# Patient Record
Sex: FEMALE | Race: BLACK OR AFRICAN AMERICAN | Employment: UNEMPLOYED | URBAN - METROPOLITAN AREA
[De-identification: names, ages, dates, MRNs, and addresses within clinical notes are randomized per-mention and may not be internally consistent; named-entity substitution may affect disease eponyms.]

---

## 2017-04-13 DIAGNOSIS — Z78.0 POST-MENOPAUSAL: ICD-10-CM

## 2017-04-13 DIAGNOSIS — C50.912 MALIGNANT NEOPLASM OF LEFT FEMALE BREAST, UNSPECIFIED SITE OF BREAST: ICD-10-CM

## 2017-04-13 RX ORDER — ANASTROZOLE 1 MG/1
1 TABLET ORAL DAILY
Qty: 30 TAB | Refills: 6 | Status: SHIPPED | OUTPATIENT
Start: 2017-04-13 | End: 2017-10-09 | Stop reason: SDUPTHER

## 2017-04-14 ENCOUNTER — HOSPITAL ENCOUNTER (OUTPATIENT)
Dept: MAMMOGRAPHY | Age: 62
Discharge: HOME | End: 2017-04-14
Attending: RADIOLOGY
Payer: MEDICAID

## 2017-04-14 DIAGNOSIS — C50.919 BREAST CANCER (HCC): ICD-10-CM

## 2017-04-14 PROCEDURE — 77066 DX MAMMO INCL CAD BI: CPT

## 2017-04-18 ENCOUNTER — HOSPITAL ENCOUNTER (OUTPATIENT)
Dept: MAMMOGRAPHY | Age: 62
Discharge: HOME | End: 2017-04-18
Attending: RADIOLOGY
Payer: MEDICAID

## 2017-04-18 VITALS
DIASTOLIC BLOOD PRESSURE: 73 MMHG | SYSTOLIC BLOOD PRESSURE: 122 MMHG | RESPIRATION RATE: 20 BRPM | HEART RATE: 75 BPM | WEIGHT: 198 LBS | TEMPERATURE: 98.6 F | OXYGEN SATURATION: 96 % | BODY MASS INDEX: 33.8 KG/M2 | HEIGHT: 64 IN

## 2017-04-18 DIAGNOSIS — R92.0 MAMMOGRAPHIC MICROCALCIFICATION: ICD-10-CM

## 2017-04-18 DIAGNOSIS — R92.8 ABNORMAL MAMMOGRAM: ICD-10-CM

## 2017-04-18 PROCEDURE — A4648 IMPLANTABLE TISSUE MARKER: HCPCS

## 2017-04-18 PROCEDURE — 77065 DX MAMMO INCL CAD UNI: CPT

## 2017-04-18 PROCEDURE — 19082 BX BREAST ADD LESION STRTCTC: CPT

## 2017-04-18 PROCEDURE — 77030013689 HC GD NDL EVIVA HOLO -A

## 2017-04-18 PROCEDURE — 77030030538 HC HNDPC BIOP EVIVA HOLO -C

## 2017-04-18 PROCEDURE — 74011000250 HC RX REV CODE- 250: Performed by: RADIOLOGY

## 2017-04-18 PROCEDURE — 88305 TISSUE EXAM BY PATHOLOGIST: CPT | Performed by: RADIOLOGY

## 2017-04-18 PROCEDURE — 19081 BX BREAST 1ST LESION STRTCTC: CPT

## 2017-04-18 RX ORDER — LIDOCAINE HYDROCHLORIDE AND EPINEPHRINE 10; 10 MG/ML; UG/ML
16 INJECTION, SOLUTION INFILTRATION; PERINEURAL ONCE
Status: ACTIVE | OUTPATIENT
Start: 2017-04-18 | End: 2017-04-18

## 2017-04-18 RX ORDER — LISINOPRIL AND HYDROCHLOROTHIAZIDE 20; 25 MG/1; MG/1
1 TABLET ORAL DAILY
COMMUNITY

## 2017-04-18 RX ORDER — LIDOCAINE HYDROCHLORIDE 10 MG/ML
24 INJECTION, SOLUTION EPIDURAL; INFILTRATION; INTRACAUDAL; PERINEURAL
Status: COMPLETED | OUTPATIENT
Start: 2017-04-18 | End: 2017-04-18

## 2017-04-18 RX ADMIN — LIDOCAINE HYDROCHLORIDE 24 ML: 10 INJECTION, SOLUTION EPIDURAL; INFILTRATION; INTRACAUDAL; PERINEURAL at 12:10

## 2017-04-18 NOTE — DISCHARGE INSTRUCTIONS
MD Kole Rodriguez [facility], RT    Alyssa Lopez, OVI                                        Instructions Following Your Breast Biopsy         Pain Control    · Tylenol should be taken as directed on the back of the bottle (gerson 4-6 hours) for the next 24 hours post breast biopsy unless directed otherwise by a physician. · No Aspirin or Anti-Inflammatory  (Motrin, Advil ) medications for 24 hours. · Wearing a soft, comfortable (Wire free ) bra, even at night, for 24 hours is helpful. · Use a clean, covered ice pack over the site every 1- 2 hours til bedtime, for 20-30 minutes at a time for the first 24 hours. Biopsy Site     · A small amount of bleeding and /or oozing from the Biopsy site is normal, as well as bloody nipple discharge, which is rare. · You may notice bruising on the skin over the next few days. · Steri Strips and a dressing have been applied. · The steri strips can remain on for up to 5 days. · The clean dressing can be removed in 48 hours, however, if the dressing becomes loose, causes redness or irritation of the skin or any drainage has collected, please remove it an replace it with a Band-Aid. · Avoid getting the Biopsy site wet for 48 hours, avoid showering, swimming and hot tubs. · Should you have excessive bleeding or pain, please seek medical treatment or call                 [email or web address] [phone], 7:30 - 4:00 p.m. After hours (ask to speak to the on-call Radiology Nurse-RN)                                      [phone] or [phone]      Activity      · Rest the day of the biopsy, avoiding strenuous activities and any heavy lifting. · You may resume most activities/ work the following day. Pathology Report     · The results of your Pathology report, from the laboratory, should be available in 3-5 business days.   The Radiologist will review your results and, based on your preference, we will be happy to provide results to you by phone or in person. · You will also be advised, at that time, of any further appointments or follow- up you may need.       Other

## 2017-04-18 NOTE — ROUTINE PROCESS
I have reviewed discharge instructions with the patient. The patient verbalized understanding. Copy of discharge instructions per AVS copied, reviewed with pt., signature sheet signed, and copy given to pt. Prior to discharge along with copy of sig. Sheet to med. Rec. For scanning r/t penpad not working. Pt stable without c/o pain at biopsy sites. Left breast biopsy sites (3 o'clock and 6 o'clock) noted clean/dry/intact with tegaderm and ice pack applied to site prior to discharge. Pt dressed self and after ok per Dr. Rosa Martínez for discharge, pt. Ambulated to waiting room for discharge to home via private vehicle with pt. Driving herself home. Pt accomp. By  Nurse to waiting room.

## 2017-04-18 NOTE — PROGRESS NOTES
Lilly Campos, repositioning pt. For procedure numerous times with Dr. Beverley Hemphill in to check stereo films.

## 2017-04-18 NOTE — IP AVS SNAPSHOT
[address] [facility] 
[phone] Patient: Sina Rey MRN: [id number] FARSHAD:[birth year] You are allergic to the following No active allergies Recent Documentation Height Weight Breastfeeding? BMI OB Status Smoking Status 1.613 m 89.8 kg No 34.52 kg/m2 Postmenopausal Never Smoker Emergency Contacts Name Discharge Info Relation Home Work Mobile Sukhwinder Quintero CAREGIVER [3] Sister [23] [phone]    
 ALBANIASidra Robles  Other Relative [6] [phone] About your hospitalization You were admitted on:  April 18, 2017 You last received care in the:  [address] [place] [address] You were discharged on:  April 18, 2017 Unit phone number:  [phone] Why you were hospitalized Your primary diagnosis was:  Not on File Providers Seen During Your Hospitalizations Provider Role Specialty Primary office phone George Pina MD Attending Provider Radiation Oncology [phone] Your Primary Care Physician (PCP) Primary Care Physician Office Phone Office Fax Holger Hakeem [phone] Follow-up Information Follow up With Details Comments Contact Info Conrad Yip NP  As needed [address] [facility] 
[phone] George Pina MD   [address] 
 
[facility] [address] [place] [address] 
[phone] Your Appointments Tuesday April 18, 2017 11:05 AM EDT  
[facility] ST VAC BX ADL CL/SPC with [facility] STEREO 1 [facility] [address] [facility]  
[phone] Arrive 30 minutes prior to exam time (15 minutes if scheduled at [facility]). Wear 2 piece outfit. Do not wear deodorant. Allow 2 Hrs for procedure. If on blood thinners, check with physician BEFORE discontinuing.  Bring Tylenol with you. Please bring someone with you if possible. ANY QUESTIONS, call Dept. -4975 [facility] [phone] [facility] [phone] [facility] [phone] [facility] Patient should report to outpatient registration (Medical Office Building One) 30 minutes prior to the appointment time unless instructed otherwise. Current Discharge Medication List  
  
ASK your doctor about these medications Dose & Instructions Dispensing Information Comments Morning Noon Evening Bedtime ADVIL 200 mg tablet Generic drug:  ibuprofen Your last dose was: Your next dose is:    
   
   
 Dose:  400 mg Take 400 mg by mouth every six (6) hours as needed for Pain. Refills:  0  
     
   
   
   
  
 anastrozole 1 mg tablet Commonly known as:  ARIMIDEX Your last dose was: Your next dose is:    
   
   
 Dose:  1 mg Take 1 Tab by mouth daily. Quantity:  30 Tab Refills:  6  
     
   
   
   
  
 levothyroxine 25 mcg tablet Commonly known as:  SYNTHROID Your last dose was: Your next dose is: Take  by mouth Daily (before breakfast). Refills:  0  
     
   
   
   
  
 lisinopril-hydroCHLOROthiazide 20-25 mg per tablet Commonly known as:  Pepito Stank Your last dose was: Your next dose is:    
   
   
 Dose:  1 Tab Take 1 Tab by mouth daily. Refills:  0 Discharge Instructions Dr. Bebe Soliman MD 
 
Iris Andres tech, RT Jaja Faith, RN Instructions Following Your Breast Biopsy Pain Control · Tylenol should be taken as directed on the back of the bottle (gerson 4-6 hours) for the next 24 hours post breast biopsy unless directed otherwise by a physician. · No Aspirin or Anti-Inflammatory  (Motrin, Advil ) medications for 24 hours. · Wearing a soft, comfortable (Wire free ) bra, even at night, for 24 hours is helpful. · Use a clean, covered ice pack over the site every 1- 2 hours til bedtime, for 20-30 minutes at a time for the first 24 hours. Biopsy Site · A small amount of bleeding and /or oozing from the Biopsy site is normal, as well as bloody nipple discharge, which is rare. · You may notice bruising on the skin over the next few days. · Steri Strips and a dressing have been applied. · The steri strips can remain on for up to 5 days. · The clean dressing can be removed in 48 hours, however, if the dressing becomes loose, causes redness or irritation of the skin or any drainage has collected, please remove it an replace it with a Band-Aid. · Avoid getting the Biopsy site wet for 48 hours, avoid showering, swimming and hot tubs. · Should you have excessive bleeding or pain, please seek medical treatment or call [address] [employer] [phone], 7:30 - 4:00 p.m. After hours (ask to speak to the on-call Radiology Nurse-RN) [phone] or [phone] Activity · Rest the day of the biopsy, avoiding strenuous activities and any heavy lifting. · You may resume most activities/ work the following day. Pathology Report · The results of your Pathology report, from the laboratory, should be available in 3-5 business days. The Radiologist will review your results and, based on your preference, we will be happy to provide results to you by phone or in person. · You will also be advised, at that time, of any further appointments or follow- up you may need. Other Discharge Orders None Introducing [facility]! Zackary Granados introduces [employer] patient portal. Now you can access parts of your medical record, email your doctor's office, and request medication refills online.    
 
1. In your internet browser, go to [email or web address] 2. Click on the First Time User? Click Here link in the Sign In box. You will see the New Member Sign Up page. 3. Enter your [employer] Access Code exactly as it appears below. You will not need to use this code after youve completed the sign-up process. If you do not sign up before the expiration date, you must request a new code. · [employer] Access Code: [email or web address] Expires: 7/11/2017  9:36 AM 
 
4. Enter the last four digits of your Social Security Number (xxxx) and Date of Birth (mm/dd/yyyy) as indicated and click Submit. You will be taken to the next sign-up page. 5. Create a [employer] ID. This will be your [employer] login ID and cannot be changed, so think of one that is secure and easy to remember. 6. Create a [employer] password. You can change your password at any time. 7. Enter your Password Reset Question and Answer. This can be used at a later time if you forget your password. 8. Enter your e-mail address. You will receive e-mail notification when new information is available in [address] 9. Click Sign Up. You can now view and download portions of your medical record. 10. Click the Download Summary menu link to download a portable copy of your medical information. If you have questions, please visit the Frequently Asked Questions section of the [employer] website. Remember, [employer] is NOT to be used for urgent needs. For medical emergencies, dial 911. Now available from your iPhone and Android! General Information Please provide this summary of care documentation to your next provider. Patient Signature:  ____________________________________________________________ Date:  ____________________________________________________________  
  
Zackary Search Provider Signature:  ____________________________________________________________ Date:  ____________________________________________________________

## 2017-04-20 NOTE — PROGRESS NOTES
Dr. Babcock Figures reviewed pathology report and recommended to continue yearly screening mammogram follow up. The patient was notified of benign results and recommendation by the nurse, B. Throckmorton RN. Dr. Darinel Robison was faxed a copy of the pathology report with follow up recomendations. Letter to pt.

## 2017-09-10 RX ORDER — ZOLEDRONIC ACID 5 MG/100ML
5 INJECTION, SOLUTION INTRAVENOUS ONCE
Status: COMPLETED | OUTPATIENT
Start: 2017-09-12 | End: 2017-09-12

## 2017-09-12 ENCOUNTER — HOSPITAL ENCOUNTER (OUTPATIENT)
Dept: INFUSION THERAPY | Age: 62
Discharge: HOME | End: 2017-09-12
Payer: MEDICAID

## 2017-09-12 VITALS
RESPIRATION RATE: 18 BRPM | WEIGHT: 202.6 LBS | HEART RATE: 80 BPM | DIASTOLIC BLOOD PRESSURE: 64 MMHG | OXYGEN SATURATION: 99 % | BODY MASS INDEX: 35.33 KG/M2 | TEMPERATURE: 98.8 F | SYSTOLIC BLOOD PRESSURE: 103 MMHG

## 2017-09-12 LAB
ALBUMIN SERPL-MCNC: 3.5 G/DL (ref 3.5–5)
ANION GAP SERPL CALC-SCNC: 8 MMOL/L (ref 5–15)
BUN SERPL-MCNC: 14 MG/DL (ref 6–20)
BUN/CREAT SERPL: 19 (ref 12–20)
CALCIUM SERPL-MCNC: 9.1 MG/DL (ref 8.5–10.1)
CHLORIDE SERPL-SCNC: 104 MMOL/L (ref 97–108)
CO2 SERPL-SCNC: 27 MMOL/L (ref 21–32)
CREAT SERPL-MCNC: 0.73 MG/DL (ref 0.55–1.02)
GLUCOSE SERPL-MCNC: 120 MG/DL (ref 65–100)
PHOSPHATE SERPL-MCNC: 2.9 MG/DL (ref 2.6–4.7)
POTASSIUM SERPL-SCNC: 3.7 MMOL/L (ref 3.5–5.1)
SODIUM SERPL-SCNC: 139 MMOL/L (ref 136–145)

## 2017-09-12 PROCEDURE — 36415 COLL VENOUS BLD VENIPUNCTURE: CPT | Performed by: INTERNAL MEDICINE

## 2017-09-12 PROCEDURE — 74011250636 HC RX REV CODE- 250/636: Performed by: INTERNAL MEDICINE

## 2017-09-12 PROCEDURE — 96374 THER/PROPH/DIAG INJ IV PUSH: CPT

## 2017-09-12 PROCEDURE — 80069 RENAL FUNCTION PANEL: CPT | Performed by: INTERNAL MEDICINE

## 2017-09-12 RX ORDER — SODIUM CHLORIDE 9 MG/ML
25 INJECTION, SOLUTION INTRAVENOUS AS NEEDED
Status: DISPENSED | OUTPATIENT
Start: 2017-09-12 | End: 2017-09-13

## 2017-09-12 RX ORDER — SODIUM CHLORIDE 0.9 % (FLUSH) 0.9 %
10-40 SYRINGE (ML) INJECTION AS NEEDED
Status: ACTIVE | OUTPATIENT
Start: 2017-09-12 | End: 2017-09-13

## 2017-09-12 RX ADMIN — Medication 10 ML: at 11:40

## 2017-09-12 RX ADMIN — Medication 10 ML: at 12:25

## 2017-09-12 RX ADMIN — SODIUM CHLORIDE 25 ML/HR: 900 INJECTION, SOLUTION INTRAVENOUS at 11:41

## 2017-09-12 RX ADMIN — ZOLEDRONIC ACID 5 MG: 5 INJECTION, SOLUTION INTRAVENOUS at 12:05

## 2017-09-12 NOTE — PROGRESS NOTES
Outpatient Infusion Center Progress Note    4030- Pt admit to [facility] for Reclast ambulatory in stable condition. Assessment completed. No new concerns voiced. Pt denies any recent or upcoming dental work. PIV established R arm with positive blood return noted. Labs drawn and sent for processing. Patient Vitals for the past 4 hrs:   Temp Pulse Resp BP SpO2   09/12/17 1225 - 80 - 103/64 -   09/12/17 1115 98.8 °F (37.1 °C) 79 18 112/69 99 %       Recent Results (from the past 12 hour(s))   RENAL FUNCTION PANEL    Collection Time: 09/12/17 11:24 AM   Result Value Ref Range    Sodium 139 136 - 145 mmol/L    Potassium 3.7 3.5 - 5.1 mmol/L    Chloride 104 97 - 108 mmol/L    CO2 27 21 - 32 mmol/L    Anion gap 8 5 - 15 mmol/L    Glucose 120 (H) 65 - 100 mg/dL    BUN 14 6 - 20 MG/DL    Creatinine 0.73 0.55 - 1.02 MG/DL    BUN/Creatinine ratio 19 12 - 20      GFR est AA >60 >60 ml/min/1.73m2    GFR est non-AA >60 >60 ml/min/1.73m2    Calcium 9.1 8.5 - 10.1 MG/DL    Phosphorus 2.9 2.6 - 4.7 MG/DL    Albumin 3.5 3.5 - 5.0 g/dL       Medications:  NS KVO  Reclast 5 mg IV over 15 mins    1225- Pt tolerated treatment well. PIV flushed and discontinued, site wrapped in gauze and coban. D/c home ambulatory in no distress.

## 2017-10-09 ENCOUNTER — OFFICE VISIT (OUTPATIENT)
Dept: ONCOLOGY | Age: 62
End: 2017-10-09

## 2017-10-09 VITALS
SYSTOLIC BLOOD PRESSURE: 124 MMHG | BODY MASS INDEX: 36.32 KG/M2 | DIASTOLIC BLOOD PRESSURE: 77 MMHG | TEMPERATURE: 98.8 F | OXYGEN SATURATION: 98 % | WEIGHT: 205 LBS | HEART RATE: 77 BPM | HEIGHT: 63 IN

## 2017-10-09 DIAGNOSIS — D05.10 DUCTAL CARCINOMA IN SITU (DCIS) OF BREAST, UNSPECIFIED LATERALITY: Primary | ICD-10-CM

## 2017-10-09 DIAGNOSIS — Z78.0 POST-MENOPAUSAL: ICD-10-CM

## 2017-10-09 RX ORDER — ANASTROZOLE 1 MG/1
1 TABLET ORAL DAILY
Qty: 30 TAB | Refills: 6 | Status: SHIPPED | OUTPATIENT
Start: 2017-10-09 | End: 2018-05-16 | Stop reason: SDUPTHER

## 2017-10-09 NOTE — PROGRESS NOTES
Pt is a 63 yr old Pt here for a yearly follow up for left breast cancer completed treatment  In 2013. Pt is on Arimidex with some aches in knees and hip or ankle. Pt gets yearly reclast, last in September.

## 2017-10-09 NOTE — MR AVS SNAPSHOT
Visit Information Date & Time Provider Department Dept. Phone Encounter #  
 10/9/2017  1:00 PM Bret June MD [address] [facility] at [facility] [address] [phone] [id number] Your Appointments 10/10/2018 11:00 AM  
ESTABLISHED PATIENT with Bret June MD  
[address] [facility] at [facility]) Appt Note: 1 yr f/u  
 [address] [facility] [address] [id number]  
[address] [place] [address] [address] Vitor Verdugo Upcoming Health Maintenance Date Due Hepatitis C Screening 1955 Pneumococcal 19-64 Highest Risk (1 of 3 - PCV13) 5/13/1976 DTaP/Tdap/Td series (1 - Tdap) 5/13/1978 FOBT Q 1 YEAR AGE 50-75 5/13/2007 PAP AKA CERVICAL CYTOLOGY 4/8/2016 ZOSTER VACCINE AGE 60> 3/13/2017 INFLUENZA AGE 9 TO ADULT 8/1/2017 BREAST CANCER SCRN MAMMOGRAM 4/14/2019 Allergies as of 10/9/2017  Review Complete On: 9/12/2017 By: Celestino Walters RN No Known Allergies Current Immunizations  Reviewed on 9/12/2017 No immunizations on file. Not reviewed this visit You Were Diagnosed With   
  
 Codes Comments Ductal carcinoma in situ (DCIS) of breast, unspecified laterality    -  Primary ICD-10-CM: D05.10 ICD-9-CM: 233.0 Post-menopausal     ICD-10-CM: Z78.0 ICD-9-CM: V49.81 Vitals BP Pulse Temp Height(growth percentile) Weight(growth percentile) SpO2  
 124/77 77 98.8 °F (37.1 °C) 5' 3\" (1.6 m) 205 lb (93 kg) 98% BMI OB Status Smoking Status 36.31 kg/m2 Postmenopausal Never Smoker BMI and BSA Data Body Mass Index Body Surface Area  
 36.31 kg/m 2 2.03 m 2 Preferred Pharmacy Pharmacy Name Phone [id number] [phone] Your Updated Medication List  
  
   
This list is accurate as of: 10/9/17  1:24 PM.  Always use your most recent med list. ADVIL 200 mg tablet Generic drug:  ibuprofen Take 400 mg by mouth every six (6) hours as needed for Pain. anastrozole 1 mg tablet Commonly known as:  ARIMIDEX Take 1 Tab by mouth daily. levothyroxine 25 mcg tablet Commonly known as:  SYNTHROID Take  by mouth Daily (before breakfast). lisinopril-hydroCHLOROthiazide 20-25 mg per tablet Commonly known as:  Helyn Blinks Take 1 Tab by mouth daily. Prescriptions Sent to Pharmacy Refills  
 anastrozole (ARIMIDEX) 1 mg tablet 6 Sig: Take 1 Tab by mouth daily. Class: Normal  
 Pharmacy: [id number] - [place], [address]  #: [phone] Route: Oral  
  
Introducing [facility]! Roverto Wilkinson introduces [employer] patient portal. Now you can access parts of your medical record, email your doctor's office, and request medication refills online. 1. In your internet browser, go to [email or web address]. [employer] 2. Click on the First Time User? Click Here link in the Sign In box. You will see the New Member Sign Up page. 3. Enter your [employer] Access Code exactly as it appears below. You will not need to use this code after youve completed the sign-up process. If you do not sign up before the expiration date, you must request a new code. · [employer] Access Code: [email or web address] Expires: 12/6/2017  8:45 AM 
 
4. Enter the last four digits of your Social Security Number (xxxx) and Date of Birth (mm/dd/yyyy) as indicated and click Submit. You will be taken to the next sign-up page. 5. Create a Clips[employer] ID. This will be your [employer] login ID and cannot be changed, so think of one that is secure and easy to remember. 6. Create a [employer] password. You can change your password at any time. 7. Enter your Password Reset Question and Answer. This can be used at a later time if you forget your password. 8. Enter your e-mail address. You will receive e-mail notification when new information is available in [address] 9. Click Sign Up. You can now view and download portions of your medical record. 10. Click the Download Summary menu link to download a portable copy of your medical information. If you have questions, please visit the Frequently Asked Questions section of the [facility] website. Remember, [facility] is NOT to be used for urgent needs. For medical emergencies, dial 911. Now available from your iPhone and Android! Please provide this summary of care documentation to your next provider. Your primary care clinician is listed as Eri Hirsch. If you have any questions after today's visit, please call [phone].

## 2017-10-09 NOTE — PROGRESS NOTES
Follow-up Note        Patient: Shin Henao MRN: [id number]  SSN: [id number]    YOB: 1955  Age: 63 y.o. Sex: female          Diagnosis:      1. 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 S/P left breast lumpectomy 05/23/2013  2. Finished breast radiation 10/2013  3. On Arimidex      Subjective: Shin Henao is a 66 y.o.  female with a diagnosis of DCIS who underwent underwent a left breast lumpectomy on 05/23/2013. She finished radiation to the left breast and is on Arimidex. She has some joint pain but is otherwise doing well.      Review of Systems:     Constitutional: negative   Eyes: negative   Ears, Nose, Mouth, Throat, and Face: negative   Respiratory: negative   Cardiovascular: negative   Gastrointestinal: negative   Integument/Breast: negative   Hematologic/Lymphatic: negative   Musculoskeletal: negative  Neurological: negative             Past Medical History:   Diagnosis Date    Breast cancer ([address]     Cancer ([address]     left breast    GERD (gastroesophageal reflux disease)     resolved per pt    Hypertension     Radiation therapy complication     3625    Thyroid disease      Past Surgical History:   Procedure Laterality Date    BREAST SURGERY PROCEDURE UNLISTED      right breast 33 years ago    BREAST SURGERY PROCEDURE UNLISTED  5/23/13    LEFT BREAST EXCISIONAL BIOPSY WITH NEEDLE LOCALIZATION     HX BREAST BIOPSY  5/23/2013    BREAST BIOPSY performed by Wiliam Lux MD at [facility] MAIN OR    HX BREAST LUMPECTOMY  05/10/2013    HX GYN      x 2 delivery       Family History   Problem Relation Age of Onset    Breast Cancer Mother 58    Cancer Mother      breast    Heart Disease Mother     Cancer Father      throat    Hypertension Father     Breast Cancer Maternal Aunt 48    Cancer Maternal Aunt      breast    Breast Cancer Maternal Aunt 48    Cancer Maternal Aunt      breast    Breast Cancer Maternal Aunt 48    Cancer Maternal Aunt      breast    Cancer Maternal Aunt 61    Cancer Sister      breast    Cancer Paternal Aunt      breast    Cancer Paternal Aunt      breast     Social History   Substance Use Topics    Smoking status: Never Smoker    Smokeless tobacco: Never Used    Alcohol use No      Prior to Admission medications    Medication Sig Start Date End Date Taking? Authorizing Provider   anastrozole (ARIMIDEX) 1 mg tablet Take 1 Tab by mouth daily. 10/9/17  Yes Renny Jack NP   lisinopril-hydroCHLOROthiazide (PRINZIDE, ZESTORETIC) 20-25 mg per tablet Take 1 Tab by mouth daily. Yes Historical Provider   ibuprofen (ADVIL) 200 mg tablet Take 400 mg by mouth every six (6) hours as needed for Pain. Yes Historical Provider   levothyroxine (SYNTHROID) 25 mcg tablet Take  by mouth Daily (before breakfast). Yes Historical Provider        No Known Allergies      Objective:     Vitals:    10/09/17 1310   BP: 124/77   Pulse: 77   Temp: 98.8 °F (37.1 °C)   SpO2: 98%   Weight: 205 lb (93 kg)   Height: 5' 3\" (1.6 m)            Physical Exam:    GENERAL: alert, cooperative   EYE: conjunctivae/corneas clear. LYMPHATIC: Cervical, supraclavicular, and axillary nodes normal.   THROAT & NECK: normal and no erythema or exudates noted. LUNG: clear to auscultation bilaterally   HEART: regular rate and rhythm   ABDOMEN: soft, non-tender. EXTREMITIES: extremities normal   SKIN: Normal.   NEUROLOGIC: negative         Assessment:      1. Left breast DCIS:    Grade 1, ER +ve, [place] +     > Left breast lumpectomy 05/2013  > Finished breast radiation 10/2013  > On Arimidex 1 mg/day   > Tolerating well    Mammogram in April 2017 - benign finding on Bx        2. Osteopenia, AI related    > On Denosumab      Plan:       1. Continue Arimidex. 2. Follow up in 1 year. Signed by: Roscoe Carty MD                     October 9, 2017          CC.  Beverley Geiger MD

## 2018-04-16 ENCOUNTER — HOSPITAL ENCOUNTER (OUTPATIENT)
Dept: MAMMOGRAPHY | Age: 63
Discharge: HOME | End: 2018-04-16
Attending: RADIOLOGY
Payer: MEDICAID

## 2018-04-16 DIAGNOSIS — Z12.39 SCREENING BREAST EXAMINATION: ICD-10-CM

## 2018-04-16 DIAGNOSIS — Z85.3 HISTORY OF LEFT BREAST CANCER: ICD-10-CM

## 2018-04-16 PROCEDURE — 77066 DX MAMMO INCL CAD BI: CPT

## 2018-05-16 DIAGNOSIS — D05.10 DUCTAL CARCINOMA IN SITU (DCIS) OF BREAST, UNSPECIFIED LATERALITY: ICD-10-CM

## 2018-05-16 DIAGNOSIS — Z78.0 POST-MENOPAUSAL: ICD-10-CM

## 2018-05-16 RX ORDER — ANASTROZOLE 1 MG/1
1 TABLET ORAL DAILY
Qty: 90 TAB | Refills: 3 | Status: SHIPPED | OUTPATIENT
Start: 2018-05-16 | End: 2019-07-04 | Stop reason: SDUPTHER

## 2018-10-03 ENCOUNTER — HOSPITAL ENCOUNTER (OUTPATIENT)
Dept: ULTRASOUND IMAGING | Age: 63
Discharge: HOME | End: 2018-10-03
Attending: NURSE PRACTITIONER
Payer: MEDICAID

## 2018-10-03 DIAGNOSIS — R74.8 ELEVATED CREATINE KINASE: ICD-10-CM

## 2018-10-03 PROCEDURE — 76705 ECHO EXAM OF ABDOMEN: CPT

## 2018-10-10 ENCOUNTER — OFFICE VISIT (OUTPATIENT)
Dept: ONCOLOGY | Age: 63
End: 2018-10-10

## 2018-10-10 VITALS
HEART RATE: 61 BPM | OXYGEN SATURATION: 95 % | BODY MASS INDEX: 35.93 KG/M2 | HEIGHT: 63 IN | WEIGHT: 202.8 LBS | SYSTOLIC BLOOD PRESSURE: 104 MMHG | DIASTOLIC BLOOD PRESSURE: 71 MMHG | TEMPERATURE: 98.3 F | RESPIRATION RATE: 16 BRPM

## 2018-10-10 DIAGNOSIS — D05.12 DUCTAL CARCINOMA IN SITU (DCIS) OF LEFT BREAST: Primary | ICD-10-CM

## 2018-10-10 PROBLEM — M85.89 OSTEOPENIA OF MULTIPLE SITES: Status: ACTIVE | Noted: 2018-10-10

## 2018-10-10 RX ORDER — SODIUM CHLORIDE 9 MG/ML
10 INJECTION INTRAMUSCULAR; INTRAVENOUS; SUBCUTANEOUS AS NEEDED
Status: CANCELLED | OUTPATIENT
Start: 2018-10-16

## 2018-10-10 RX ORDER — HEPARIN 100 UNIT/ML
300-500 SYRINGE INTRAVENOUS AS NEEDED
Status: CANCELLED
Start: 2018-10-16

## 2018-10-10 RX ORDER — ALBUTEROL SULFATE 0.83 MG/ML
2.5 SOLUTION RESPIRATORY (INHALATION) AS NEEDED
Status: CANCELLED
Start: 2018-10-16

## 2018-10-10 RX ORDER — SODIUM CHLORIDE 0.9 % (FLUSH) 0.9 %
10 SYRINGE (ML) INJECTION AS NEEDED
Status: CANCELLED
Start: 2018-10-16

## 2018-10-10 RX ORDER — ACETAMINOPHEN 325 MG/1
650 TABLET ORAL AS NEEDED
Status: CANCELLED
Start: 2018-10-16

## 2018-10-10 RX ORDER — EPINEPHRINE 1 MG/ML
0.3 INJECTION, SOLUTION, CONCENTRATE INTRAVENOUS AS NEEDED
Status: CANCELLED | OUTPATIENT
Start: 2018-10-16

## 2018-10-10 RX ORDER — DIPHENHYDRAMINE HYDROCHLORIDE 50 MG/ML
50 INJECTION, SOLUTION INTRAMUSCULAR; INTRAVENOUS AS NEEDED
Status: CANCELLED
Start: 2018-10-16

## 2018-10-10 RX ORDER — SODIUM CHLORIDE 9 MG/ML
25 INJECTION, SOLUTION INTRAVENOUS CONTINUOUS
Status: CANCELLED | OUTPATIENT
Start: 2018-10-16

## 2018-10-10 RX ORDER — ONDANSETRON 2 MG/ML
8 INJECTION INTRAMUSCULAR; INTRAVENOUS AS NEEDED
Status: CANCELLED | OUTPATIENT
Start: 2018-10-16

## 2018-10-10 RX ORDER — HYDROCORTISONE SODIUM SUCCINATE 100 MG/2ML
100 INJECTION, POWDER, FOR SOLUTION INTRAMUSCULAR; INTRAVENOUS AS NEEDED
Status: CANCELLED | OUTPATIENT
Start: 2018-10-16

## 2018-10-10 NOTE — PROGRESS NOTES
Kwasi Garcia is a 66 y.o. female here today for left sided breast cancer f/u. Patient taking Arimidex. Reclast last given; 9/2017. VS stable. Patient denies pain. Good appetite. Patient denies N/V/D and constipation. Patient denies numbness and tingling. Patient denies mouth ulcers. Patient denies cough. Patient denies SOB.  Patient reports left breast spasms     Visit Vitals    /71 (BP 1 Location: Left arm, BP Patient Position: Sitting)    Pulse 61    Temp 98.3 °F (36.8 °C) (Oral)    Resp 16    Ht 5' 3\" (1.6 m)    Wt 202 lb 12.8 oz (92 kg)    SpO2 95%    BMI 35.92 kg/m2

## 2018-10-10 NOTE — PROGRESS NOTES
Follow-up Note        Patient: John Welch MRN: [id number]  SSN: [id number]    YOB: 1955  Age: 66 y.o. Sex: female          Diagnosis:      1. Left breast DCIS: Dx: 4/19/2017   Grade 1, ER +ve, NE +     Treatment:      1. S/P left breast lumpectomy 05/23/2013  2. Finished breast radiation 10/2013  3. On Arimidex    Subjective: John Welch is a 66 y.o.  female with a diagnosis of DCIS who underwent underwent a left breast lumpectomy on 05/23/2013. She finished radiation to the left breast and has now completed 5 years of hormonal therapy with Arimidex. She feels well today with no complaints.       Review of Systems:     Constitutional: negative   Eyes: negative   Ears, Nose, Mouth, Throat, and Face: negative   Respiratory: negative   Cardiovascular: negative   Gastrointestinal: negative   Integument/Breast: negative   Hematologic/Lymphatic: negative   Musculoskeletal: negative  Neurological: negative             Past Medical History:   Diagnosis Date    [facility] [facility]     Cancer ([facility] [facility]     left breast    GERD (gastroesophageal reflux disease)     resolved per pt    Hypertension     Radiation therapy complication     1825    Thyroid disease      Past Surgical History:   Procedure Laterality Date    BREAST SURGERY PROCEDURE UNLISTED      right breast 33 years ago    BREAST SURGERY PROCEDURE UNLISTED  5/23/13    LEFT BREAST EXCISIONAL BIOPSY WITH NEEDLE LOCALIZATION     HX BREAST BIOPSY  5/23/2013    BREAST BIOPSY performed by Sharan Regan MD at [facility] MAIN OR    HX BREAST LUMPECTOMY  05/10/2013    HX GYN      x 2 delivery       Family History   Problem Relation Age of Onset    Breast Cancer Mother 58    Cancer Mother      breast    Heart Disease Mother     Cancer Father      throat    Hypertension Father     Breast Cancer Maternal Aunt 48    Cancer Maternal Aunt      breast    Breast Cancer Maternal Aunt 48    Cancer Maternal Aunt      breast    Breast Cancer Maternal Aunt 48    Cancer Maternal Aunt      breast    Cancer Maternal Aunt 61    Cancer Sister      breast    Cancer Paternal Aunt      breast    Cancer Paternal Aunt      breast     Social History   Substance Use Topics    Smoking status: Never Smoker    Smokeless tobacco: Never Used    Alcohol use No      Prior to Admission medications    Medication Sig Start Date End Date Taking? Authorizing Provider   anastrozole (ARIMIDEX) 1 mg tablet Take 1 Tab by mouth daily. 5/16/18  Yes Adrian Kauffman MD   lisinopril-hydroCHLOROthiazide (PRINZIDE, ZESTORETIC) 20-25 mg per tablet Take 1 Tab by mouth daily. Yes Historical Provider   ibuprofen (ADVIL) 200 mg tablet Take 400 mg by mouth every six (6) hours as needed for Pain. Yes Historical Provider   levothyroxine (SYNTHROID) 25 mcg tablet Take  by mouth Daily (before breakfast). Yes Historical Provider        No Known Allergies      Objective:     Vitals:    10/10/18 1139   BP: 104/71   Pulse: 61   Resp: 16   Temp: 98.3 °F (36.8 °C)   TempSrc: Oral   SpO2: 95%   Weight: 202 lb 12.8 oz (92 kg)   Height: 5' 3\" (1.6 m)            Physical Exam:    GENERAL: alert, cooperative   EYE: conjunctivae/corneas clear. LYMPHATIC: Cervical, supraclavicular, and axillary nodes normal.   THROAT & NECK: normal and no erythema or exudates noted. LUNG: clear to auscultation bilaterally   HEART: regular rate and rhythm   ABDOMEN: soft, non-tender. EXTREMITIES: extremities normal   SKIN: Normal.   NEUROLOGIC: negative         Assessment:      1. Left breast DCIS:    Grade 1, ER +ve, [place] +     > Left breast lumpectomy 05/2013  > Finished breast radiation 10/2013  > On Arimidex 1 mg/day   > Tolerating well  > In remission    Mammogram (4/16/208) - normal    Symptom management form reviewed with patient.       2. Osteopenia, AI related    > On Denosumab      Plan:       > Discontinue Arimidex - completed 5 years of hormonal therapy  > Reclast  > Follow-up in 1 year      Signed by: Lai West MD                     October 11, 2018          CC.  Earline Stevens MD

## 2018-10-15 RX ORDER — ZOLEDRONIC ACID 5 MG/100ML
5 INJECTION, SOLUTION INTRAVENOUS ONCE
Status: CANCELLED
Start: 2018-10-16 | End: 2018-10-16

## 2018-10-16 ENCOUNTER — HOSPITAL ENCOUNTER (OUTPATIENT)
Dept: INFUSION THERAPY | Age: 63
Discharge: HOME | End: 2018-10-16
Payer: MEDICAID

## 2018-10-16 VITALS
RESPIRATION RATE: 18 BRPM | DIASTOLIC BLOOD PRESSURE: 75 MMHG | HEART RATE: 68 BPM | TEMPERATURE: 97.9 F | SYSTOLIC BLOOD PRESSURE: 115 MMHG

## 2018-10-16 DIAGNOSIS — D05.10 DUCTAL CARCINOMA IN SITU (DCIS) OF BREAST, UNSPECIFIED LATERALITY: ICD-10-CM

## 2018-10-16 DIAGNOSIS — M85.89 OSTEOPENIA OF MULTIPLE SITES: ICD-10-CM

## 2018-10-16 LAB
ANION GAP BLD CALC-SCNC: 15 MMOL/L (ref 10–20)
BUN BLD-MCNC: 18 MG/DL (ref 9–20)
CA-I BLD-MCNC: 1.18 MMOL/L (ref 1.12–1.32)
CHLORIDE BLD-SCNC: 102 MMOL/L (ref 98–107)
CO2 BLD-SCNC: 27 MMOL/L (ref 21–32)
CREAT BLD-MCNC: 0.7 MG/DL (ref 0.6–1.3)
GLUCOSE BLD-MCNC: 104 MG/DL (ref 65–100)
HCT VFR BLD CALC: 45 % (ref 35–47)
MAGNESIUM SERPL-MCNC: 1.8 MG/DL (ref 1.6–2.4)
PHOSPHATE SERPL-MCNC: 3.4 MG/DL (ref 2.6–4.7)
POTASSIUM BLD-SCNC: 3.8 MMOL/L (ref 3.5–5.1)
SERVICE CMNT-IMP: ABNORMAL
SODIUM BLD-SCNC: 138 MMOL/L (ref 136–145)

## 2018-10-16 PROCEDURE — 84100 ASSAY OF PHOSPHORUS: CPT | Performed by: INTERNAL MEDICINE

## 2018-10-16 PROCEDURE — 83735 ASSAY OF MAGNESIUM: CPT | Performed by: INTERNAL MEDICINE

## 2018-10-16 PROCEDURE — 96365 THER/PROPH/DIAG IV INF INIT: CPT

## 2018-10-16 PROCEDURE — 36415 COLL VENOUS BLD VENIPUNCTURE: CPT | Performed by: INTERNAL MEDICINE

## 2018-10-16 PROCEDURE — 74011250636 HC RX REV CODE- 250/636: Performed by: INTERNAL MEDICINE

## 2018-10-16 PROCEDURE — 80047 BASIC METABLC PNL IONIZED CA: CPT

## 2018-10-16 RX ORDER — SODIUM CHLORIDE 0.9 % (FLUSH) 0.9 %
10 SYRINGE (ML) INJECTION AS NEEDED
Status: ACTIVE | OUTPATIENT
Start: 2018-10-16 | End: 2018-10-16

## 2018-10-16 RX ORDER — SODIUM CHLORIDE 9 MG/ML
10 INJECTION INTRAMUSCULAR; INTRAVENOUS; SUBCUTANEOUS AS NEEDED
Status: ACTIVE | OUTPATIENT
Start: 2018-10-16 | End: 2018-10-16

## 2018-10-16 RX ORDER — SODIUM CHLORIDE 9 MG/ML
25 INJECTION, SOLUTION INTRAVENOUS CONTINUOUS
Status: DISPENSED | OUTPATIENT
Start: 2018-10-16 | End: 2018-10-16

## 2018-10-16 RX ORDER — ZOLEDRONIC ACID 5 MG/100ML
5 INJECTION, SOLUTION INTRAVENOUS ONCE
Status: COMPLETED | OUTPATIENT
Start: 2018-10-16 | End: 2018-10-16

## 2018-10-16 RX ORDER — HEPARIN 100 UNIT/ML
300-500 SYRINGE INTRAVENOUS AS NEEDED
Status: ACTIVE | OUTPATIENT
Start: 2018-10-16 | End: 2018-10-16

## 2018-10-16 RX ADMIN — Medication 10 ML: at 10:43

## 2018-10-16 RX ADMIN — Medication 10 ML: at 10:42

## 2018-10-16 RX ADMIN — Medication 10 ML: at 10:00

## 2018-10-16 RX ADMIN — ZOLEDRONIC ACID 5 MG: 5 INJECTION, SOLUTION INTRAVENOUS at 10:25

## 2019-04-17 ENCOUNTER — HOSPITAL ENCOUNTER (OUTPATIENT)
Dept: MAMMOGRAPHY | Age: 64
Discharge: HOME | End: 2019-04-17
Attending: RADIOLOGY
Payer: MEDICAID

## 2019-04-17 DIAGNOSIS — Z85.3 PERSONAL HISTORY OF MALIGNANT NEOPLASM OF BREAST: ICD-10-CM

## 2019-04-17 PROCEDURE — 77067 SCR MAMMO BI INCL CAD: CPT

## 2019-07-04 DIAGNOSIS — D05.10 DUCTAL CARCINOMA IN SITU (DCIS) OF BREAST, UNSPECIFIED LATERALITY: ICD-10-CM

## 2019-07-04 DIAGNOSIS — Z78.0 POST-MENOPAUSAL: ICD-10-CM

## 2019-07-07 RX ORDER — ANASTROZOLE 1 MG/1
TABLET ORAL
Qty: 90 TAB | Refills: 2 | Status: SHIPPED | OUTPATIENT
Start: 2019-07-07

## 2019-07-17 ENCOUNTER — DOCUMENTATION ONLY (OUTPATIENT)
Dept: ONCOLOGY | Age: 64
End: 2019-07-17

## 2019-07-17 NOTE — PROGRESS NOTES
[facility]  Social Work Navigator Encounter     Patient Name:  Cynthia Riosr    Medical History: dx breast cancer  (2013)     Advance Directives:    Narrative: benign bx in 04/2017  - pt completed 5 years of hormonal treatment   PT called to say she would bring the Medicaid determination letter to be completed. SW explained pt has completed treatment and thus Dr will not complete the form.      Barriers to Care:     Assessment/Action:      Plan/Referral:

## 2019-10-10 ENCOUNTER — OFFICE VISIT (OUTPATIENT)
Dept: ONCOLOGY | Age: 64
End: 2019-10-10

## 2019-10-10 VITALS
TEMPERATURE: 98.1 F | WEIGHT: 204.2 LBS | RESPIRATION RATE: 18 BRPM | HEIGHT: 63 IN | BODY MASS INDEX: 36.18 KG/M2 | SYSTOLIC BLOOD PRESSURE: 111 MMHG | OXYGEN SATURATION: 96 % | HEART RATE: 67 BPM | DIASTOLIC BLOOD PRESSURE: 73 MMHG

## 2019-10-10 DIAGNOSIS — E66.01 SEVERE OBESITY (HCC): ICD-10-CM

## 2019-10-10 DIAGNOSIS — D05.10 DUCTAL CARCINOMA IN SITU (DCIS) OF BREAST, UNSPECIFIED LATERALITY: Primary | ICD-10-CM

## 2019-10-10 NOTE — PROGRESS NOTES
Follow-up Note        Patient: Tory Htuton MRN: [id number]  SSN: [id number]    YOB: 1955  Age: 60 y.o. Sex: female          Diagnosis:      1. Left breast DCIS: Dx: 4/19/2017   Grade 1, ER +ve, [place] +     Treatment:      1. S/P left breast lumpectomy 05/23/2013  2. Finished breast radiation 10/2013  3. On Arimidex    Subjective: Tory Hutton is a 66 y.o.  female with a diagnosis of DCIS who underwent underwent a left breast lumpectomy on 05/23/2013. She finished radiation to the left breast and has completed 5 years of hormonal therapy with Arimidex and stopped the medicine. She feels well today with no complaints.       Review of Systems:     Constitutional: negative   Eyes: negative   Ears, Nose, Mouth, Throat, and Face: negative   Respiratory: negative   Cardiovascular: negative   Gastrointestinal: negative   Integument/Breast: negative   Hematologic/Lymphatic: negative   Musculoskeletal: negative  Neurological: negative             Past Medical History:   Diagnosis Date    [facility] [address]     Cancer ([address]     left breast    GERD (gastroesophageal reflux disease)     resolved per pt    Hypertension     Radiation therapy complication     6759    Thyroid disease      Past Surgical History:   Procedure Laterality Date    BREAST SURGERY PROCEDURE UNLISTED      right breast 33 years ago    BREAST SURGERY PROCEDURE UNLISTED  5/23/13    LEFT BREAST EXCISIONAL BIOPSY WITH NEEDLE LOCALIZATION     HX BREAST BIOPSY  5/23/2013    BREAST BIOPSY performed by Rosa Maria Gomez MD at [facility] MA[facility]    HX BREAST LUMPECTOMY  05/10/2013    HX GYN      x 2 delivery       Family History   Problem Relation Age of Onset    Breast Cancer Mother 58    Cancer Mother         breast    Heart Disease Mother     Cancer Father         throat    Hypertension Father     Breast Cancer Maternal Aunt 48    Cancer Maternal Aunt         breast    Breast Cancer Maternal Aunt 48    Cancer Maternal Aunt         breast    Breast Cancer Maternal Aunt 48    Cancer Maternal Aunt         breast    Cancer Maternal Aunt 61    Cancer Sister         breast    Cancer Paternal Aunt         breast    Cancer Paternal Aunt         breast     Social History     Tobacco Use    Smoking status: Never Smoker    Smokeless tobacco: Never Used   Substance Use Topics    Alcohol use: No      Prior to Admission medications    Medication Sig Start Date End Date Taking? Authorizing Provider   lisinopril-hydroCHLOROthiazide (PRINZIDE, ZESTORETIC) 20-25 mg per tablet Take 1 Tab by mouth daily. Yes Provider, Historical   levothyroxine (SYNTHROID) 25 mcg tablet Take  by mouth Daily (before breakfast). Yes Provider, Historical   anastrozole (ARIMIDEX) 1 mg tablet TAKE 1 TABLET BY MOUTH DAILY. 7/7/19   Nirali Baca MD   ibuprofen (ADVIL) 200 mg tablet Take 400 mg by mouth every six (6) hours as needed for Pain. Provider, Historical        No Known Allergies      Objective:     Vitals:    10/10/19 1347   BP: 111/73   Pulse: 67   Resp: 18   Temp: 98.1 °F (36.7 °C)   TempSrc: Oral   SpO2: 96%   Weight: 204 lb 3.2 oz (92.6 kg)   Height: 5' 3\" (1.6 m)            Physical Exam:    GENERAL: alert, cooperative   EYE: conjunctivae/corneas clear. LYMPHATIC: Cervical, supraclavicular, and axillary nodes normal.   THROAT & NECK: normal and no erythema or exudates noted. LUNG: clear to auscultation bilaterally   HEART: regular rate and rhythm   ABDOMEN: soft, non-tender. EXTREMITIES: extremities normal   SKIN: Normal.   NEUROLOGIC: negative         Assessment:      1. Left breast DCIS:    Grade 1, ER +ve, [place] +     > Left breast lumpectomy 05/2013  > Finished breast radiation 10/2013  > On Arimidex 1 mg/day - completed 5 years, now off the medicine    In remission    2.  Osteopenia, AI related    > Completed 5 years of Reclast  > D/C      Plan:       > D/C Reclast  > Return as needed      Signed by: Law Fernández MD October 10, 2019          CC.  Aleisha Fowler MD

## 2019-10-10 NOTE — PROGRESS NOTES
61 y/o AAF here for f/u appt for breast cancer. Pt has been off of AI due to completion of 5 years. Reclast was approx 1 year ago. 1. Have you been to the ER, urgent care clinic since your last visit? Hospitalized since your last visit? No    2. Have you seen or consulted any other health care providers outside of the [address] [place] [address] since your last visit? Include any pap smears or colon screening. Yes PCP  9/13/19, routine f/u. And radiation f/u with [facility] ,Dr. Jordan Garza.

## 2022-03-19 PROBLEM — M85.89 OSTEOPENIA OF MULTIPLE SITES: Status: ACTIVE | Noted: 2018-10-10

## 2022-03-19 PROBLEM — E66.01 SEVERE OBESITY (HCC): Status: ACTIVE | Noted: 2019-10-10

## 2022-07-22 ENCOUNTER — TRANSCRIBE ORDER (OUTPATIENT)
Dept: SCHEDULING | Age: 67
End: 2022-07-22

## 2022-07-22 DIAGNOSIS — Z12.31 SCREENING MAMMOGRAM FOR HIGH-RISK PATIENT: Primary | ICD-10-CM

## 2022-08-17 ENCOUNTER — HOSPITAL ENCOUNTER (OUTPATIENT)
Dept: MAMMOGRAPHY | Age: 67
Discharge: HOME | End: 2022-08-17
Attending: NURSE PRACTITIONER
Payer: MEDICARE

## 2022-08-17 DIAGNOSIS — Z12.31 SCREENING MAMMOGRAM FOR HIGH-RISK PATIENT: ICD-10-CM

## 2022-08-17 PROCEDURE — 77067 SCR MAMMO BI INCL CAD: CPT

## 2023-05-25 RX ORDER — IBUPROFEN 200 MG
400 TABLET ORAL EVERY 6 HOURS PRN
COMMUNITY

## 2023-05-25 RX ORDER — LISINOPRIL AND HYDROCHLOROTHIAZIDE 25; 20 MG/1; MG/1
1 TABLET ORAL DAILY
COMMUNITY

## 2023-05-25 RX ORDER — LEVOTHYROXINE SODIUM 0.03 MG/1
TABLET ORAL
COMMUNITY

## 2023-05-25 RX ORDER — ANASTROZOLE 1 MG/1
1 TABLET ORAL DAILY
COMMUNITY
Start: 2019-07-07

## 2024-12-17 ENCOUNTER — TELEPHONE (OUTPATIENT)
Age: 69
End: 2024-12-17

## 2024-12-24 ENCOUNTER — TELEPHONE (OUTPATIENT)
Age: 69
End: 2024-12-24

## 2024-12-27 ENCOUNTER — TRANSCRIBE ORDERS (OUTPATIENT)
Facility: HOSPITAL | Age: 69
End: 2024-12-27

## 2024-12-27 DIAGNOSIS — Z12.31 OTHER SCREENING MAMMOGRAM: Primary | ICD-10-CM

## 2025-01-03 ENCOUNTER — TELEPHONE (OUTPATIENT)
Age: 70
End: 2025-01-03

## 2025-01-03 NOTE — TELEPHONE ENCOUNTER
LVM for PT requesting a CB to schedule and advised referral is now closed due to 3x attempt to contact.

## 2025-01-09 ENCOUNTER — HOSPITAL ENCOUNTER (OUTPATIENT)
Facility: HOSPITAL | Age: 70
Discharge: HOME | End: 2025-01-12
Payer: MEDICARE

## 2025-01-09 DIAGNOSIS — Z12.31 OTHER SCREENING MAMMOGRAM: ICD-10-CM

## 2025-01-09 PROCEDURE — 77063 BREAST TOMOSYNTHESIS BI: CPT

## 2025-01-24 ENCOUNTER — TELEPHONE (OUTPATIENT)
Age: 70
End: 2025-01-24

## 2025-01-24 NOTE — TELEPHONE ENCOUNTER
Enrolled with [employer] - Ordered and being shipped to patient's home address on file.  ETA within 5-7 business days.         Message  Received: Today  Tamar Pike LPN [place], Carrie Hello. Can you please order a 2 weeks E. Holter for palpitations for this patients.  Thanks  Tamar

## 2025-03-10 ENCOUNTER — CLINICAL DOCUMENTATION (OUTPATIENT)
Age: 70
End: 2025-03-10

## 2025-03-10 NOTE — PROGRESS NOTES
Received fax from [employer] for medical records for holter monitor. Faxed office visit with Dr. Rodriguez 1/24/25 to Shay kinney [phone].

## 2025-03-18 ENCOUNTER — ANCILLARY PROCEDURE (OUTPATIENT)
Age: 70
End: 2025-03-18
Payer: MEDICARE

## 2025-03-18 VITALS
DIASTOLIC BLOOD PRESSURE: 88 MMHG | BODY MASS INDEX: 38.98 KG/M2 | HEART RATE: 75 BPM | SYSTOLIC BLOOD PRESSURE: 156 MMHG | HEIGHT: 63 IN | WEIGHT: 220 LBS

## 2025-03-18 VITALS
WEIGHT: 220 LBS | DIASTOLIC BLOOD PRESSURE: 95 MMHG | BODY MASS INDEX: 38.98 KG/M2 | HEIGHT: 63 IN | SYSTOLIC BLOOD PRESSURE: 160 MMHG

## 2025-03-18 DIAGNOSIS — Z76.89 ESTABLISHING CARE WITH NEW DOCTOR, ENCOUNTER FOR: ICD-10-CM

## 2025-03-18 DIAGNOSIS — R00.2 HEART PALPITATIONS: ICD-10-CM

## 2025-03-18 DIAGNOSIS — E66.812 CLASS 2 SEVERE OBESITY DUE TO EXCESS CALORIES WITH SERIOUS COMORBIDITY AND BODY MASS INDEX (BMI) OF 38.0 TO 38.9 IN ADULT: ICD-10-CM

## 2025-03-18 DIAGNOSIS — R94.31 ABNORMAL EKG: ICD-10-CM

## 2025-03-18 DIAGNOSIS — D05.10 DUCTAL CARCINOMA IN SITU (DCIS) OF BREAST, UNSPECIFIED LATERALITY: ICD-10-CM

## 2025-03-18 DIAGNOSIS — I45.10 RBBB: ICD-10-CM

## 2025-03-18 DIAGNOSIS — R07.9 CHEST PAIN, UNSPECIFIED TYPE: ICD-10-CM

## 2025-03-18 DIAGNOSIS — E66.01 CLASS 2 SEVERE OBESITY DUE TO EXCESS CALORIES WITH SERIOUS COMORBIDITY AND BODY MASS INDEX (BMI) OF 38.0 TO 38.9 IN ADULT: ICD-10-CM

## 2025-03-18 DIAGNOSIS — Z82.49 FAMILY HISTORY OF ISCHEMIC HEART DISEASE: ICD-10-CM

## 2025-03-18 DIAGNOSIS — I10 ESSENTIAL (PRIMARY) HYPERTENSION: ICD-10-CM

## 2025-03-18 DIAGNOSIS — I20.9 ANGINA, CLASS III: ICD-10-CM

## 2025-03-18 DIAGNOSIS — E78.2 MIXED HYPERLIPIDEMIA: ICD-10-CM

## 2025-03-18 DIAGNOSIS — R60.0 BILATERAL LOWER EXTREMITY EDEMA: ICD-10-CM

## 2025-03-18 LAB
ECHO AO ASC DIAM: 3.2 CM
ECHO AO ASCENDING AORTA INDEX: 1.59 CM/M2
ECHO AO ROOT DIAM: 3.2 CM
ECHO AO ROOT INDEX: 1.59 CM/M2
ECHO AV AREA PEAK VELOCITY: 3.3 CM2
ECHO AV AREA VTI: 3.1 CM2
ECHO AV AREA/BSA PEAK VELOCITY: 1.6 CM2/M2
ECHO AV AREA/BSA VTI: 1.5 CM2/M2
ECHO AV MEAN GRADIENT: 3 MMHG
ECHO AV MEAN VELOCITY: 0.8 M/S
ECHO AV PEAK GRADIENT: 5 MMHG
ECHO AV PEAK VELOCITY: 1.1 M/S
ECHO AV VELOCITY RATIO: 1
ECHO AV VTI: 26.5 CM
ECHO BSA: 2.11 M2
ECHO LA DIAMETER INDEX: 1.64 CM/M2
ECHO LA DIAMETER: 3.3 CM
ECHO LA TO AORTIC ROOT RATIO: 1.03
ECHO LA VOL A-L A2C: 66 ML (ref 22–52)
ECHO LA VOL A-L A4C: 49 ML (ref 22–52)
ECHO LA VOL BP: 54 ML (ref 22–52)
ECHO LA VOL MOD A2C: 64 ML (ref 22–52)
ECHO LA VOL MOD A4C: 44 ML (ref 22–52)
ECHO LA VOL/BSA BIPLANE: 27 ML/M2 (ref 16–34)
ECHO LA VOLUME AREA LENGTH: 58 ML
ECHO LA VOLUME INDEX A-L A2C: 33 ML/M2 (ref 16–34)
ECHO LA VOLUME INDEX A-L A4C: 24 ML/M2 (ref 16–34)
ECHO LA VOLUME INDEX AREA LENGTH: 29 ML/M2 (ref 16–34)
ECHO LA VOLUME INDEX MOD A2C: 32 ML/M2 (ref 16–34)
ECHO LA VOLUME INDEX MOD A4C: 22 ML/M2 (ref 16–34)
ECHO LV E' LATERAL VELOCITY: 10.57 CM/S
ECHO LV E' SEPTAL VELOCITY: 6.72 CM/S
ECHO LV EDV A2C: 86 ML
ECHO LV EDV A4C: 75 ML
ECHO LV EDV BP: 82 ML (ref 56–104)
ECHO LV EDV INDEX A4C: 37 ML/M2
ECHO LV EDV INDEX BP: 41 ML/M2
ECHO LV EDV NDEX A2C: 43 ML/M2
ECHO LV EF PHYSICIAN: 60 %
ECHO LV EJECTION FRACTION A2C: 59 %
ECHO LV EJECTION FRACTION A4C: 50 %
ECHO LV EJECTION FRACTION BIPLANE: 56 % (ref 55–100)
ECHO LV ESV A2C: 35 ML
ECHO LV ESV A4C: 37 ML
ECHO LV ESV BP: 36 ML (ref 19–49)
ECHO LV ESV INDEX A2C: 17 ML/M2
ECHO LV ESV INDEX A4C: 18 ML/M2
ECHO LV ESV INDEX BP: 18 ML/M2
ECHO LV FRACTIONAL SHORTENING: 40 % (ref 28–44)
ECHO LV INTERNAL DIMENSION DIASTOLE INDEX: 2.64 CM/M2
ECHO LV INTERNAL DIMENSION DIASTOLIC: 5.3 CM (ref 3.9–5.3)
ECHO LV INTERNAL DIMENSION SYSTOLIC INDEX: 1.59 CM/M2
ECHO LV INTERNAL DIMENSION SYSTOLIC: 3.2 CM
ECHO LV IVSD: 0.7 CM (ref 0.6–0.9)
ECHO LV IVSS: 1.3 CM
ECHO LV MASS 2D: 138.3 G (ref 67–162)
ECHO LV MASS INDEX 2D: 68.8 G/M2 (ref 43–95)
ECHO LV POSTERIOR WALL DIASTOLIC: 0.8 CM (ref 0.6–0.9)
ECHO LV POSTERIOR WALL SYSTOLIC: 1.5 CM
ECHO LV RELATIVE WALL THICKNESS RATIO: 0.3
ECHO LVOT AREA: 3.5 CM2
ECHO LVOT AV VTI INDEX: 0.89
ECHO LVOT DIAM: 2.1 CM
ECHO LVOT MEAN GRADIENT: 2 MMHG
ECHO LVOT PEAK GRADIENT: 5 MMHG
ECHO LVOT PEAK VELOCITY: 1.1 M/S
ECHO LVOT STROKE VOLUME INDEX: 40.5 ML/M2
ECHO LVOT SV: 81.4 ML
ECHO LVOT VTI: 23.5 CM
ECHO MV A VELOCITY: 0.98 M/S
ECHO MV AREA PHT: 3.5 CM2
ECHO MV AREA VTI: 3.7 CM2
ECHO MV E DECELERATION TIME (DT): 218.9 MS
ECHO MV E VELOCITY: 0.91 M/S
ECHO MV E/A RATIO: 0.93
ECHO MV E/E' LATERAL: 8.61
ECHO MV E/E' RATIO (AVERAGED): 11.08
ECHO MV E/E' SEPTAL: 13.54
ECHO MV LVOT VTI INDEX: 0.94
ECHO MV MAX VELOCITY: 0.9 M/S
ECHO MV MEAN GRADIENT: 2 MMHG
ECHO MV MEAN VELOCITY: 0.6 M/S
ECHO MV PEAK GRADIENT: 3 MMHG
ECHO MV PRESSURE HALF TIME (PHT): 63.5 MS
ECHO MV VTI: 22.1 CM
ECHO PV MAX VELOCITY: 0.7 M/S
ECHO PV PEAK GRADIENT: 2 MMHG
ECHO RV FREE WALL PEAK S': 9.7 CM/S
ECHO RV TAPSE: 2.2 CM (ref 1.7–?)
ECHO RVOT PEAK GRADIENT: 1 MMHG
ECHO RVOT PEAK VELOCITY: 0.6 M/S

## 2025-03-18 PROCEDURE — 93306 TTE W/DOPPLER COMPLETE: CPT | Performed by: INTERNAL MEDICINE

## 2025-03-18 PROCEDURE — 93017 CV STRESS TEST TRACING ONLY: CPT | Performed by: INTERNAL MEDICINE

## 2025-03-23 LAB
ECHO BSA: 2.11 M2
STRESS ANGINA INDEX: 0
STRESS BASELINE DIAS BP: 88 MMHG
STRESS BASELINE HR: 71 BPM
STRESS BASELINE ST DEPRESSION: 0 MM
STRESS BASELINE SYS BP: 156 MMHG
STRESS ESTIMATED WORKLOAD: 6.8 METS
STRESS EXERCISE DUR MIN: 3 MIN
STRESS EXERCISE DUR SEC: 43 SEC
STRESS O2 SAT PEAK: 97 %
STRESS O2 SAT REST: 95 %
STRESS PEAK DIAS BP: 98 MMHG
STRESS PEAK SYS BP: 186 MMHG
STRESS PERCENT HR ACHIEVED: 90 %
STRESS POST PEAK HR: 137 BPM
STRESS RATE PRESSURE PRODUCT: NORMAL BPM*MMHG
STRESS SR DUKE TREADMILL SCORE: 4
STRESS ST DEPRESSION: 0 MM
STRESS TARGET HR: 153 BPM

## 2025-03-23 PROCEDURE — 93018 CV STRESS TEST I&R ONLY: CPT | Performed by: INTERNAL MEDICINE

## 2025-03-23 PROCEDURE — 93016 CV STRESS TEST SUPVJ ONLY: CPT | Performed by: INTERNAL MEDICINE

## 2025-04-24 ENCOUNTER — HOSPITAL ENCOUNTER (OUTPATIENT)
Facility: HOSPITAL | Age: 70
Discharge: HOME | End: 2025-04-27
Payer: MEDICARE

## 2025-04-24 DIAGNOSIS — Z78.0 POSTMENOPAUSAL STATE: ICD-10-CM

## 2025-04-24 PROCEDURE — 77080 DXA BONE DENSITY AXIAL: CPT

## 2025-07-15 RX ORDER — LORATADINE 10 MG/1
10 TABLET ORAL DAILY
COMMUNITY

## 2025-07-16 ENCOUNTER — ANESTHESIA (OUTPATIENT)
Facility: HOSPITAL | Age: 70
End: 2025-07-16
Payer: MEDICARE

## 2025-07-16 ENCOUNTER — HOSPITAL ENCOUNTER (OUTPATIENT)
Facility: HOSPITAL | Age: 70
Setting detail: OUTPATIENT SURGERY
Discharge: HOME | End: 2025-07-16
Attending: SPECIALIST | Admitting: SPECIALIST
Payer: MEDICARE

## 2025-07-16 ENCOUNTER — ANESTHESIA EVENT (OUTPATIENT)
Facility: HOSPITAL | Age: 70
End: 2025-07-16
Payer: MEDICARE

## 2025-07-16 VITALS
WEIGHT: 221 LBS | BODY MASS INDEX: 37.73 KG/M2 | RESPIRATION RATE: 17 BRPM | DIASTOLIC BLOOD PRESSURE: 59 MMHG | TEMPERATURE: 97.6 F | OXYGEN SATURATION: 90 % | SYSTOLIC BLOOD PRESSURE: 114 MMHG | HEIGHT: 64 IN | HEART RATE: 72 BPM

## 2025-07-16 PROCEDURE — 3700000001 HC ADD 15 MINUTES (ANESTHESIA): Performed by: SPECIALIST

## 2025-07-16 PROCEDURE — 2580000003 HC RX 258: Performed by: SPECIALIST

## 2025-07-16 PROCEDURE — 2709999900 HC NON-CHARGEABLE SUPPLY: Performed by: SPECIALIST

## 2025-07-16 PROCEDURE — 3600007502: Performed by: SPECIALIST

## 2025-07-16 PROCEDURE — 7100000010 HC PHASE II RECOVERY - FIRST 15 MIN: Performed by: SPECIALIST

## 2025-07-16 PROCEDURE — 6360000002 HC RX W HCPCS: Performed by: NURSE ANESTHETIST, CERTIFIED REGISTERED

## 2025-07-16 PROCEDURE — 3600007512: Performed by: SPECIALIST

## 2025-07-16 PROCEDURE — 7100000011 HC PHASE II RECOVERY - ADDTL 15 MIN: Performed by: SPECIALIST

## 2025-07-16 PROCEDURE — 3700000000 HC ANESTHESIA ATTENDED CARE: Performed by: SPECIALIST

## 2025-07-16 RX ORDER — SODIUM CHLORIDE 0.9 % (FLUSH) 0.9 %
5-40 SYRINGE (ML) INJECTION EVERY 12 HOURS SCHEDULED
Status: DISCONTINUED | OUTPATIENT
Start: 2025-07-16 | End: 2025-07-16 | Stop reason: HOSPADM

## 2025-07-16 RX ORDER — SODIUM CHLORIDE 9 MG/ML
INJECTION, SOLUTION INTRAVENOUS PRN
Status: DISCONTINUED | OUTPATIENT
Start: 2025-07-16 | End: 2025-07-16 | Stop reason: HOSPADM

## 2025-07-16 RX ORDER — SODIUM CHLORIDE 0.9 % (FLUSH) 0.9 %
5-40 SYRINGE (ML) INJECTION PRN
Status: DISCONTINUED | OUTPATIENT
Start: 2025-07-16 | End: 2025-07-16 | Stop reason: HOSPADM

## 2025-07-16 RX ORDER — 0.9 % SODIUM CHLORIDE 0.9 %
INTRAVENOUS SOLUTION INTRAVENOUS
Status: DISCONTINUED | OUTPATIENT
Start: 2025-07-16 | End: 2025-07-16

## 2025-07-16 RX ADMIN — PROPOFOL 30 MG: 10 INJECTION, EMULSION INTRAVENOUS at 14:54

## 2025-07-16 RX ADMIN — PROPOFOL 50 MG: 10 INJECTION, EMULSION INTRAVENOUS at 14:44

## 2025-07-16 RX ADMIN — PROPOFOL 70 MG: 10 INJECTION, EMULSION INTRAVENOUS at 14:41

## 2025-07-16 RX ADMIN — PROPOFOL 50 MG: 10 INJECTION, EMULSION INTRAVENOUS at 14:46

## 2025-07-16 RX ADMIN — PROPOFOL 50 MG: 10 INJECTION, EMULSION INTRAVENOUS at 14:50

## 2025-07-16 RX ADMIN — SODIUM CHLORIDE: 0.9 INJECTION, SOLUTION INTRAVENOUS at 14:12

## 2025-07-16 ASSESSMENT — PAIN - FUNCTIONAL ASSESSMENT: PAIN_FUNCTIONAL_ASSESSMENT: NONE - DENIES PAIN

## 2025-07-16 NOTE — ANESTHESIA POSTPROCEDURE EVALUATION
Department of Anesthesiology  Postprocedure Note    Patient: Heather Luong  MRN: [id number]  YOB: 1955  Date of evaluation: 7/16/2025    Procedure Summary       Date: 07/16/25 [facility] / [facility] ENDO [facility] / [facility]    Anesthesia Start: 1437 Anesthesia Stop: 1504    Procedure: COLONOSCOPY Diagnosis:       Special screening for malignant neoplasms, colon      (Special screening for malignant neoplasms, colon [Z12.11])    Surgeons: Hamilton Tobar MD Responsible Provider: Cj Oconnor MD    Anesthesia Type: MAC ASA Status: 3            Anesthesia Type: MAC    Robinson Phase I: Robinson Score: 10    Robinson Phase II: Robinson Score: 10    Anesthesia Post Evaluation    Patient location during evaluation: PACU  Patient participation: complete - patient participated  Level of consciousness: awake and alert  Airway patency: patent  Nausea & Vomiting: no nausea  Cardiovascular status: hemodynamically stable  Respiratory status: acceptable  Hydration status: euvolemic    No notable events documented.

## 2025-07-16 NOTE — PROGRESS NOTES
Endoscopy recovery  Patient returned to baseline, vital signs stable (see vital sign flowsheet). Patient offered liquids and tolerated well. Respiratory status within defined limits. Abdomen soft not tender. Skin with in defined limits. Responsible party driving patient home was given the opportunity to ask questions. Patient discharged with documented belongings.    Bloody nose from nasal trumpet insertion during procedure when she first came to recovery. Has slowed down and is resolving. NORMA Dennis informed, ok for discharge.

## 2025-07-16 NOTE — DISCHARGE INSTRUCTIONS
Heather Luong  [id number]  1955    COLON DISCHARGE INSTRUCTIONS  Discomfort:  Redness at IV site- apply warm compress to area; if redness or soreness persist- contact your physician  There may be a slight amount of blood passed from the rectum  Gaseous discomfort- walking, belching will help relieve any discomfort  You may not operate a vehicle for 12 hours  You may not engage in an occupation involving machinery or appliances for rest of today  You may not drink alcoholic beverages for at least 12 hours  Avoid making any critical decisions for at least 24 hour  DIET:   Regular diet.   - however -  remember your colon is empty and a heavy meal will produce gas.   Avoid these foods:  vegetables, fried / greasy foods, carbonated drinks for today.    MEDICATIONS:        Regarding Aspirin or Nonsteroidal medications, please see below.    ACTIVITY:  You may resume your normal daily activities it is recommended that you spend the remainder of the day resting -  avoid any strenuous activity.    CALL M.D.  ANY SIGN OF:  Increasing pain, nausea, vomiting  Abdominal distension (swelling)  New increased bleeding (oral or rectal)  Fever (chills)  Pain in chest area  Bloody discharge from nose or mouth  Shortness of breath    Tylenol as needed for pain.      Follow-up Instructions:   Call Dr. Tobar for questions about procedure at telephone #  [phone]              Repeat Colonoscopy in 10 years    Findings:  No polyps    Patient Education on Sedation / Analgesia Administered for Procedure      For 24 hours after general anesthesia or intravenous analgesia / sedation:  Have someone responsible help you with your care  Limit your activities  Do not drive and operate hazardous machinery  Do not make important personal, legal or business decisions  Do not drink alcoholic beverages  If you have not urinated within 8 hours after discharge, please contact your physician  Resume your medications unless otherwise

## 2025-07-16 NOTE — OP NOTE
Colonoscopy Procedure Note    Indications:   Screening colonoscopy    Referring Physician: Jessica Durant APRN - NP  Anesthesia/Sedation: MAC anesthesia Propofol  Endoscopist:  Dr. Hamilton Tobar    Procedure in Detail:  Informed consent was obtained for the procedure, including sedation.  Risks of perforation, hemorrhage, adverse drug reaction, and aspiration were discussed. The patient was placed in the left lateral decubitus position.  Based on the pre-procedure assessment, including review of the patient's medical history, medications, allergies, and review of systems, she had been deemed to be an appropriate candidate for moderate sedation; she was therefore sedated with the medications listed above.   The patient was monitored continuously with ECG tracing, pulse oximetry, blood pressure monitoring, and direct observations.      A rectal examination was performed. The [id number] was inserted into the rectum and advanced under direct vision to the cecum, which was identified by the ileocecal valve and appendiceal orifice.  The quality of the colonic preparation was adequate.  A careful inspection was made as the colonoscope was withdrawn, including a retroflexed view of the rectum; findings and interventions are described below.  Appropriate photodocumentation was obtained.    Findings:    Scope advanced to the cecum.    Preparation was adequate.   No polyps were seen.   Mild sigmoid diverticulosis.    Therapies:  none    Specimen:  none     Complications: None were encountered during the procedure.     EBL: < 10 ml.    Recommendations:   -Repeat Colonoscopy in 10 years    Signed By: Hamilton Tobar MD                        July 16, 2025

## 2025-07-16 NOTE — H&P
Gastroenterology Outpatient History and Physical    Patient: [facility]    Physician: Hamilton Tobar MD    Vital Signs: Blood pressure 117/60, pulse 72, temperature 98.3 °F (36.8 °C), temperature source Temporal, resp. rate 20, height 1.613 m (5' 3.5\"), weight 100.2 kg (221 lb), SpO2 95%.    Allergies: No Known Allergies    Chief Complaint: CRC screening    History of Present Illness: above    Justification for Procedure: above    History:  Past Medical History:   Diagnosis Date    Breast cancer (HCC) 2013    left with radiation    GERD (gastroesophageal reflux disease)     resolved per pt    History of therapeutic radiation     Hyperlipidemia     Hypertension     Radiation therapy complication     2013    Seasonal allergic rhinitis     Thyroid disease     hypothyroidism      Past Surgical History:   Procedure Laterality Date    BREAST BIOPSY  5/23/2013    BREAST BIOPSY performed by Jennifer Hoover MD at [facility] OR    BREAST LUMPECTOMY  05/10/2013    BREAST SURGERY  06/09/1980    benign    BREAST SURGERY  5/23/13    LEFT BREAST EXCISIONAL BIOPSY WITH NEEDLE LOCALIZATION     GYN      x 2 delivery       Social History     Socioeconomic History    Marital status:      Spouse name: None    Number of children: None    Years of education: None    Highest education level: None   Tobacco Use    Smoking status: Never    Smokeless tobacco: Never   Vaping Use    Vaping status: Never Used   Substance and Sexual Activity    Alcohol use: No     Comment: socially    Drug use: Never      Family History   Problem Relation Age of Onset    Hypertension Father     Cancer Father         throat    Heart Disease Mother     Cancer Paternal Aunt         breast    Cancer Paternal Aunt         breast    Cancer Sister         breast    Cancer Maternal Aunt         breast    Cancer Maternal Aunt 60    Cancer Maternal Aunt         breast    Breast Cancer Maternal Aunt 50    Cancer Maternal Aunt         breast    Breast Cancer

## 2025-07-16 NOTE — ANESTHESIA PRE PROCEDURE
infusion   IntraVENous PRN Hamilton Tobar MD           Allergies:  No Known Allergies    Problem List:    Patient Active Problem List   Diagnosis Code   • DCIS (ductal carcinoma in situ) D05.10   • Severe obesity (HCC) E66.01   • Osteopenia of multiple sites M85.89   • Vitamin D deficiency E55.9       Past Medical History:        Diagnosis Date   • Breast cancer (HCC) 2013    left with radiation   • GERD (gastroesophageal reflux disease)     resolved per pt   • History of therapeutic radiation    • Hyperlipidemia    • Hypertension    • Radiation therapy complication     2013   • Seasonal allergic rhinitis    • Thyroid disease     hypothyroidism       Past Surgical History:        Procedure Laterality Date   • BREAST BIOPSY  5/23/2013    BREAST BIOPSY performed by [facility] Hoover MD at [facility]   • BREAST LUMPECTOMY  05/10/2013   • BREAST SURGERY  06/09/1980    benign   • BREAST SURGERY  5/23/13    LEFT BREAST EXCISIONAL BIOPSY WITH NEEDLE LOCALIZATION    • GYN      x 2 delivery        Social History:    Social History     Tobacco Use   • Smoking status: Never   • Smokeless tobacco: Never   Substance Use Topics   • Alcohol use: No     Comment: socially                                Counseling given: Not Answered      Vital Signs (Current): There were no vitals filed for this visit.                                           BP Readings from Last 3 Encounters:   03/18/25 (!) 156/88   03/18/25 (!) 160/95   01/24/25 (!) 146/96       NPO Status:                                                                                 BMI:   Wt Readings from Last 3 Encounters:   03/18/25 99.8 kg (220 lb)   03/18/25 99.8 kg (220 lb)   02/04/25 99.8 kg (220 lb)     There is no height or weight on file to calculate BMI.    CBC: No results found for: \"WBC\", \"RBC\", \"HGB\", \"HCT\", \"MCV\", \"RDW\", \"PLT\"    CMP: No results found for: \"NA\", \"K\", \"CL\", \"CO2\", \"BUN\", \"CREATININE\", \"GFRAA\", \"AGRATIO\", \"LABGLOM\", \"GLUCOSE\", \"GLU\",

## 2025-07-16 NOTE — PROGRESS NOTES
ARRIVAL INFORMATION:  Verified patient name and date of birth, scheduled procedure, and informed consent.     : Kalli (sister) contact number: [phone]  Physician and staff can share information with the .     Receive texts: yes    Belongings with patient include:  Clothing,Glasses, Dentures upper, jewelry    GI FOCUSED ASSESSMENT:  Neuro: Awake, alert, oriented x4  Respiratory: even and unlabored   GI: soft and non-distended    Education:Reviewed general discharge instructions and  information.

## (undated) DEVICE — CUFF BLD PRSS AD CLTH SGL TB W/ BAYNT CONN ROUNDED CORNER

## (undated) DEVICE — SET GRAV CK VLV NEEDLESS ST 3 GANGED 4WAY STPCOCK HI FLO 10

## (undated) DEVICE — ENDOSCOPIC KIT COMPLIANCE ENDOKIT

## (undated) DEVICE — IV START KIT: Brand: MEDLINE